# Patient Record
Sex: FEMALE | Race: OTHER | HISPANIC OR LATINO | Employment: UNEMPLOYED | URBAN - METROPOLITAN AREA
[De-identification: names, ages, dates, MRNs, and addresses within clinical notes are randomized per-mention and may not be internally consistent; named-entity substitution may affect disease eponyms.]

---

## 2022-02-22 ENCOUNTER — HOSPITAL ENCOUNTER (OUTPATIENT)
Facility: HOSPITAL | Age: 32
Discharge: PSYCHIATRIC OR SUBSTANCE USE FACILITY | End: 2022-02-23
Attending: EMERGENCY MEDICINE | Admitting: INTERNAL MEDICINE

## 2022-02-22 DIAGNOSIS — R07.9 CHEST PAIN: ICD-10-CM

## 2022-02-22 DIAGNOSIS — T14.91XA SUICIDE ATTEMPT: ICD-10-CM

## 2022-02-22 PROBLEM — T50.912A SUICIDE ATTEMPT BY MULTIPLE DRUG OVERDOSE: Status: ACTIVE | Noted: 2022-02-22

## 2022-02-22 LAB
ALBUMIN SERPL BCP-MCNC: 3.8 G/DL (ref 3.5–5.2)
ALP SERPL-CCNC: 54 U/L (ref 55–135)
ALT SERPL W/O P-5'-P-CCNC: 12 U/L (ref 10–44)
AMORPH CRY UR QL COMP ASSIST: ABNORMAL
AMPHET+METHAMPHET UR QL: NEGATIVE
ANION GAP SERPL CALC-SCNC: 8 MMOL/L (ref 8–16)
APAP SERPL-MCNC: <3 UG/ML (ref 10–20)
AST SERPL-CCNC: 18 U/L (ref 10–40)
B-HCG UR QL: NEGATIVE
BARBITURATES UR QL SCN>200 NG/ML: NEGATIVE
BASOPHILS # BLD AUTO: 0.03 K/UL (ref 0–0.2)
BASOPHILS NFR BLD: 0.5 % (ref 0–1.9)
BENZODIAZ UR QL SCN>200 NG/ML: NEGATIVE
BILIRUB SERPL-MCNC: 0.6 MG/DL (ref 0.1–1)
BILIRUB UR QL STRIP: NEGATIVE
BUN SERPL-MCNC: 8 MG/DL (ref 6–20)
BZE UR QL SCN: NEGATIVE
CALCIUM SERPL-MCNC: 9 MG/DL (ref 8.7–10.5)
CANNABINOIDS UR QL SCN: NEGATIVE
CHLORIDE SERPL-SCNC: 109 MMOL/L (ref 95–110)
CLARITY UR REFRACT.AUTO: ABNORMAL
CO2 SERPL-SCNC: 23 MMOL/L (ref 23–29)
COLOR UR AUTO: YELLOW
CREAT SERPL-MCNC: 0.7 MG/DL (ref 0.5–1.4)
CREAT UR-MCNC: 31 MG/DL (ref 15–325)
CTP QC/QA: YES
CTP QC/QA: YES
DIFFERENTIAL METHOD: NORMAL
EOSINOPHIL # BLD AUTO: 0 K/UL (ref 0–0.5)
EOSINOPHIL NFR BLD: 0.5 % (ref 0–8)
ERYTHROCYTE [DISTWIDTH] IN BLOOD BY AUTOMATED COUNT: 12.5 % (ref 11.5–14.5)
EST. GFR  (AFRICAN AMERICAN): >60 ML/MIN/1.73 M^2
EST. GFR  (NON AFRICAN AMERICAN): >60 ML/MIN/1.73 M^2
ETHANOL SERPL-MCNC: <10 MG/DL
GLUCOSE SERPL-MCNC: 79 MG/DL (ref 70–110)
GLUCOSE UR QL STRIP: NEGATIVE
HCT VFR BLD AUTO: 40.2 % (ref 37–48.5)
HGB BLD-MCNC: 13.4 G/DL (ref 12–16)
HGB UR QL STRIP: ABNORMAL
IMM GRANULOCYTES # BLD AUTO: 0.02 K/UL (ref 0–0.04)
IMM GRANULOCYTES NFR BLD AUTO: 0.3 % (ref 0–0.5)
KETONES UR QL STRIP: NEGATIVE
LEUKOCYTE ESTERASE UR QL STRIP: ABNORMAL
LYMPHOCYTES # BLD AUTO: 1.5 K/UL (ref 1–4.8)
LYMPHOCYTES NFR BLD: 24 % (ref 18–48)
MCH RBC QN AUTO: 28 PG (ref 27–31)
MCHC RBC AUTO-ENTMCNC: 33.3 G/DL (ref 32–36)
MCV RBC AUTO: 84 FL (ref 82–98)
METHADONE UR QL SCN>300 NG/ML: NEGATIVE
MICROSCOPIC COMMENT: ABNORMAL
MONOCYTES # BLD AUTO: 0.3 K/UL (ref 0.3–1)
MONOCYTES NFR BLD: 5.2 % (ref 4–15)
NEUTROPHILS # BLD AUTO: 4.3 K/UL (ref 1.8–7.7)
NEUTROPHILS NFR BLD: 69.5 % (ref 38–73)
NITRITE UR QL STRIP: NEGATIVE
NRBC BLD-RTO: 0 /100 WBC
OPIATES UR QL SCN: NEGATIVE
PCP UR QL SCN>25 NG/ML: NEGATIVE
PH UR STRIP: 7 [PH] (ref 5–8)
PLATELET # BLD AUTO: 201 K/UL (ref 150–450)
PMV BLD AUTO: 9.7 FL (ref 9.2–12.9)
POTASSIUM SERPL-SCNC: 3.6 MMOL/L (ref 3.5–5.1)
PROT SERPL-MCNC: 6.9 G/DL (ref 6–8.4)
PROT UR QL STRIP: NEGATIVE
RBC # BLD AUTO: 4.78 M/UL (ref 4–5.4)
RBC #/AREA URNS AUTO: 5 /HPF (ref 0–4)
SARS-COV-2 RDRP RESP QL NAA+PROBE: NEGATIVE
SODIUM SERPL-SCNC: 140 MMOL/L (ref 136–145)
SP GR UR STRIP: 1 (ref 1–1.03)
SQUAMOUS #/AREA URNS AUTO: 6 /HPF
TOXICOLOGY INFORMATION: NORMAL
TSH SERPL DL<=0.005 MIU/L-ACNC: 0.64 UIU/ML (ref 0.4–4)
URN SPEC COLLECT METH UR: ABNORMAL
WBC # BLD AUTO: 6.16 K/UL (ref 3.9–12.7)
WBC #/AREA URNS AUTO: 5 /HPF (ref 0–5)

## 2022-02-22 PROCEDURE — 80143 DRUG ASSAY ACETAMINOPHEN: CPT | Performed by: EMERGENCY MEDICINE

## 2022-02-22 PROCEDURE — 99291 CRITICAL CARE FIRST HOUR: CPT | Mod: CS,,, | Performed by: EMERGENCY MEDICINE

## 2022-02-22 PROCEDURE — 80307 DRUG TEST PRSMV CHEM ANLYZR: CPT | Performed by: EMERGENCY MEDICINE

## 2022-02-22 PROCEDURE — U0002 COVID-19 LAB TEST NON-CDC: HCPCS | Performed by: EMERGENCY MEDICINE

## 2022-02-22 PROCEDURE — 99220 PR INITIAL OBSERVATION CARE,LEVL III: ICD-10-PCS | Mod: ,,, | Performed by: INTERNAL MEDICINE

## 2022-02-22 PROCEDURE — 96361 HYDRATE IV INFUSION ADD-ON: CPT

## 2022-02-22 PROCEDURE — 63600175 PHARM REV CODE 636 W HCPCS

## 2022-02-22 PROCEDURE — 85025 COMPLETE CBC W/AUTO DIFF WBC: CPT | Performed by: EMERGENCY MEDICINE

## 2022-02-22 PROCEDURE — 80053 COMPREHEN METABOLIC PANEL: CPT | Performed by: EMERGENCY MEDICINE

## 2022-02-22 PROCEDURE — 99291 PR CRITICAL CARE, E/M 30-74 MINUTES: ICD-10-PCS | Mod: CS,,, | Performed by: EMERGENCY MEDICINE

## 2022-02-22 PROCEDURE — 84443 ASSAY THYROID STIM HORMONE: CPT | Performed by: EMERGENCY MEDICINE

## 2022-02-22 PROCEDURE — 96360 HYDRATION IV INFUSION INIT: CPT

## 2022-02-22 PROCEDURE — 82077 ASSAY SPEC XCP UR&BREATH IA: CPT | Performed by: EMERGENCY MEDICINE

## 2022-02-22 PROCEDURE — 81001 URINALYSIS AUTO W/SCOPE: CPT | Performed by: EMERGENCY MEDICINE

## 2022-02-22 PROCEDURE — 99291 CRITICAL CARE FIRST HOUR: CPT | Mod: 25

## 2022-02-22 PROCEDURE — G0378 HOSPITAL OBSERVATION PER HR: HCPCS

## 2022-02-22 PROCEDURE — 81025 URINE PREGNANCY TEST: CPT | Performed by: EMERGENCY MEDICINE

## 2022-02-22 PROCEDURE — 99220 PR INITIAL OBSERVATION CARE,LEVL III: CPT | Mod: ,,, | Performed by: INTERNAL MEDICINE

## 2022-02-22 RX ORDER — NALOXONE HCL 0.4 MG/ML
0.4 VIAL (ML) INJECTION ONCE
Status: DISCONTINUED | OUTPATIENT
Start: 2022-02-22 | End: 2022-02-22

## 2022-02-22 RX ORDER — IBUPROFEN 200 MG
16 TABLET ORAL
Status: DISCONTINUED | OUTPATIENT
Start: 2022-02-22 | End: 2022-02-23 | Stop reason: HOSPADM

## 2022-02-22 RX ORDER — SODIUM CHLORIDE 0.9 % (FLUSH) 0.9 %
10 SYRINGE (ML) INJECTION EVERY 12 HOURS PRN
Status: DISCONTINUED | OUTPATIENT
Start: 2022-02-22 | End: 2022-02-23 | Stop reason: HOSPADM

## 2022-02-22 RX ORDER — IBUPROFEN 200 MG
24 TABLET ORAL
Status: DISCONTINUED | OUTPATIENT
Start: 2022-02-22 | End: 2022-02-23 | Stop reason: HOSPADM

## 2022-02-22 RX ORDER — GLUCAGON 1 MG
1 KIT INJECTION
Status: DISCONTINUED | OUTPATIENT
Start: 2022-02-22 | End: 2022-02-23 | Stop reason: HOSPADM

## 2022-02-22 RX ORDER — NALOXONE HCL 0.4 MG/ML
0.02 VIAL (ML) INJECTION
Status: DISCONTINUED | OUTPATIENT
Start: 2022-02-22 | End: 2022-02-23 | Stop reason: HOSPADM

## 2022-02-22 RX ADMIN — SODIUM CHLORIDE, SODIUM LACTATE, POTASSIUM CHLORIDE, AND CALCIUM CHLORIDE 1000 ML: .6; .31; .03; .02 INJECTION, SOLUTION INTRAVENOUS at 02:02

## 2022-02-22 NOTE — ASSESSMENT & PLAN NOTE
Pt presenting following a suicide attempt, found next to empty bottles of percocet and flexeril. Altered mental status appears likely 2/2 flexeril intoxication. UDS negative for opiates. Bilateral pupils ERRL. No focal neuro deficits. No previous psych hx. Suicide attempt could be 2/2 recent maritial stresses vs adverse medication effect.     Plan:  -- patient PEC'd  -- consult psychiatry once patient more alert and interactive  -- no need for narcan at this time as patient protecting airway, MIKI, no evidence of opioids on UDS  -- ctm O2 while patient sedated  -- IVF bolus

## 2022-02-22 NOTE — H&P
"[facility]  Hospital Medicine  History & Physical    Patient Name: Radha Arroyo  MRN: [id number]  Patient Class: OP- Observation  Admission Date: 2/22/2022  Attending Physician: Monse Chow MD   Primary Care Provider: No primary care provider on file.         Patient information was obtained from spouse/SO and ER records.     Subjective:     Principal Problem:<principal problem not specified>    Chief Complaint:   Chief Complaint   Patient presents with    Suicide Attempt     Found with empty bottle of oxycodone and flexirl. Decrease LOC. Patient arrives AAOx4.         HPI: 30 y/o F no significant pmhx presents via EMS following a suicide attempt. Pt altered at time of admission. History obtained via  and ED provider. Per , he and patient have been having marital problems for a few months.  states patient cheated on him 2 months ago, and ever since then their marriage has struggled. Last night,  mentioned to patient that he "needed some space", and the patient became very upset and anxious. This morning after  took the kids to school, he received a text from the patient saying "take care of the kids for me." He tried to contact the patient but she didn't reply. Pt came home to find the patient drowsy and altered on the ground next to a bottle of percocet and a bottle of flexeril. Unknown how many pills patient took. Pt has no previous psych history or medical history. No past suicide ideations or attempts. Of note, patient is currently on accutane, and was told by her PCP to double her dose 2 weeks ago. Pt is on no other medications.     In ED patient HDS, afebrile. Patient protecting airway, satting well on RA. Drowsy but arousable to deep stimulation, though unable to answer questions or interact. UDS pan negative. EtOH negative. Tylenol negative. Cr wnl, CBC normal. Patient PEC'd and admitted to medicine for medical clearance and psych eval.       No past " medical history on file.    No past surgical history on file.    Review of patient's allergies indicates:  Not on File    No current facility-administered medications on file prior to encounter.     No current outpatient medications on file prior to encounter.     Family History    None       Tobacco Use    Smoking status: Not on file    Smokeless tobacco: Not on file   Substance and Sexual Activity    Alcohol use: Not on file    Drug use: Not on file    Sexual activity: Not on file     Review of Systems   Unable to perform ROS: Mental status change   Objective:     Vital Signs (Most Recent):  Temp: 97.7 °F (36.5 °C) (02/22/22 1407)  Pulse: 83 (02/22/22 1407)  Resp: 16 (02/22/22 1407)  BP: 113/63 (02/22/22 1407)  SpO2: 98 % (02/22/22 1407) Vital Signs (24h Range):  Temp:  [97.4 °F (36.3 °C)-97.7 °F (36.5 °C)] 97.7 °F (36.5 °C)  Pulse:  [70-83] 83  Resp:  [16-18] 16  SpO2:  [98 %-99 %] 98 %  BP: (113-138)/(63-82) 113/63        There is no height or weight on file to calculate BMI.    Physical Exam  Vitals and nursing note reviewed.   Constitutional:       General: She is sleeping. She is not in acute distress.     Appearance: Normal appearance. She is well-developed. She is not ill-appearing, toxic-appearing or diaphoretic.   HENT:      Head: Normocephalic and atraumatic.      Nose: Nose normal. No congestion.      Mouth/Throat:      Mouth: Mucous membranes are dry.   Eyes:      Conjunctiva/sclera: Conjunctivae normal.      Pupils: Pupils are equal, round, and reactive to light.   Cardiovascular:      Rate and Rhythm: Normal rate and regular rhythm.      Pulses: Normal pulses.      Heart sounds: Normal heart sounds. No murmur heard.    No friction rub. No gallop.   Pulmonary:      Effort: Pulmonary effort is normal. No respiratory distress.      Breath sounds: Normal breath sounds. No stridor. No wheezing, rhonchi or rales.      Comments: RR 16, good air movement, protecting airway  Abdominal:      General:  Abdomen is flat. Bowel sounds are normal. There is no distension.      Palpations: Abdomen is soft.      Tenderness: There is no abdominal tenderness.   Musculoskeletal:         General: No swelling. Normal range of motion.      Cervical back: Normal range of motion and neck supple.   Skin:     General: Skin is warm and dry.      Coloration: Skin is not jaundiced.   Neurological:      General: No focal deficit present.      Mental Status: She is easily aroused. She is lethargic.      Comments: Pt drowsy but arousable to voice, whispers appropriate answers to questions, though quickly falls back asleep.      Psychiatric:      Comments: Pt drowsy, lethargic         CRANIAL NERVES     CN III, IV, VI   Pupils are equal, round, and reactive to light.     Significant Labs: All pertinent labs within the past 24 hours have been reviewed.  CBC:   Recent Labs   Lab 02/22/22  1221   WBC 6.16   HGB 13.4   HCT 40.2        CMP:   Recent Labs   Lab 02/22/22  1221      K 3.6      CO2 23   GLU 79   BUN 8   CREATININE 0.7   CALCIUM 9.0   PROT 6.9   ALBUMIN 3.8   BILITOT 0.6   ALKPHOS 54*   AST 18   ALT 12   ANIONGAP 8   EGFRNONAA >60.0     TSH:   Recent Labs   Lab 02/22/22  1221   TSH 0.638     Urine Studies:   Recent Labs   Lab 02/22/22  1206   COLORU Yellow   APPEARANCEUA Hazy*   PHUR 7.0   SPECGRAV 1.005   PROTEINUA Negative   GLUCUA Negative   KETONESU Negative   BILIRUBINUA Negative   OCCULTUA 1+*   NITRITE Negative   LEUKOCYTESUR 3+*   RBCUA 5*   WBCUA 5   SQUAMEPITHEL 6       Significant Imaging: I have reviewed all pertinent imaging results/findings within the past 24 hours.    Assessment/Plan:     Suicide attempt by multiple drug overdose  Pt presenting following a suicide attempt, found next to empty bottles of percocet and flexeril. Altered mental status appears likely 2/2 flexeril intoxication. UDS negative for opiates. Bilateral pupils ERRL. No focal neuro deficits. No previous psych hx. Suicide  attempt could be 2/2 recent maritial stresses vs adverse medication effect.     Plan:  -- patient PEC'd  -- consult psychiatry once patient more alert and interactive  -- no need for narcan at this time as patient protecting airway, MIKI, no evidence of opioids on UDS  -- ctm O2 while patient sedated  -- IVF bolus        VTE Risk Mitigation (From admission, onward)         Ordered     IP VTE LOW RISK PATIENT  Once         02/22/22 1350     Place sequential compression device  Until discontinued         02/22/22 1350                   Richie Short MD   Internal Medicine PGY-1  Department of Hospital Medicine   [facility]

## 2022-02-22 NOTE — SUBJECTIVE & OBJECTIVE
No past medical history on file.    No past surgical history on file.    Review of patient's allergies indicates:  Not on File    No current facility-administered medications on file prior to encounter.     No current outpatient medications on file prior to encounter.     Family History    None       Tobacco Use    Smoking status: Not on file    Smokeless tobacco: Not on file   Substance and Sexual Activity    Alcohol use: Not on file    Drug use: Not on file    Sexual activity: Not on file     Review of Systems   Unable to perform ROS: Mental status change   Objective:     Vital Signs (Most Recent):  Temp: 97.7 °F (36.5 °C) (02/22/22 1407)  Pulse: 83 (02/22/22 1407)  Resp: 16 (02/22/22 1407)  BP: 113/63 (02/22/22 1407)  SpO2: 98 % (02/22/22 1407) Vital Signs (24h Range):  Temp:  [97.4 °F (36.3 °C)-97.7 °F (36.5 °C)] 97.7 °F (36.5 °C)  Pulse:  [70-83] 83  Resp:  [16-18] 16  SpO2:  [98 %-99 %] 98 %  BP: (113-138)/(63-82) 113/63        There is no height or weight on file to calculate BMI.    Physical Exam  Vitals and nursing note reviewed.   Constitutional:       General: She is sleeping. She is not in acute distress.     Appearance: Normal appearance. She is well-developed. She is not ill-appearing, toxic-appearing or diaphoretic.   HENT:      Head: Normocephalic and atraumatic.      Nose: Nose normal. No congestion.      Mouth/Throat:      Mouth: Mucous membranes are dry.   Eyes:      Conjunctiva/sclera: Conjunctivae normal.      Pupils: Pupils are equal, round, and reactive to light.   Cardiovascular:      Rate and Rhythm: Normal rate and regular rhythm.      Pulses: Normal pulses.      Heart sounds: Normal heart sounds. No murmur heard.    No friction rub. No gallop.   Pulmonary:      Effort: Pulmonary effort is normal. No respiratory distress.      Breath sounds: Normal breath sounds. No stridor. No wheezing, rhonchi or rales.      Comments: RR 16, good air movement, protecting airway  Abdominal:      General:  Abdomen is flat. Bowel sounds are normal. There is no distension.      Palpations: Abdomen is soft.      Tenderness: There is no abdominal tenderness.   Musculoskeletal:         General: No swelling. Normal range of motion.      Cervical back: Normal range of motion and neck supple.   Skin:     General: Skin is warm and dry.      Coloration: Skin is not jaundiced.   Neurological:      General: No focal deficit present.      Mental Status: She is easily aroused. She is lethargic.      Comments: Pt drowsy but arousable to voice, whispers appropriate answers to questions, though quickly falls back asleep.      Psychiatric:      Comments: Pt drowsy, lethargic         CRANIAL NERVES     CN III, IV, VI   Pupils are equal, round, and reactive to light.     Significant Labs: All pertinent labs within the past 24 hours have been reviewed.  CBC:   Recent Labs   Lab 02/22/22  1221   WBC 6.16   HGB 13.4   HCT 40.2        CMP:   Recent Labs   Lab 02/22/22  1221      K 3.6      CO2 23   GLU 79   BUN 8   CREATININE 0.7   CALCIUM 9.0   PROT 6.9   ALBUMIN 3.8   BILITOT 0.6   ALKPHOS 54*   AST 18   ALT 12   ANIONGAP 8   EGFRNONAA >60.0     TSH:   Recent Labs   Lab 02/22/22  1221   TSH 0.638     Urine Studies:   Recent Labs   Lab 02/22/22  1206   COLORU Yellow   APPEARANCEUA Hazy*   PHUR 7.0   SPECGRAV 1.005   PROTEINUA Negative   GLUCUA Negative   KETONESU Negative   BILIRUBINUA Negative   OCCULTUA 1+*   NITRITE Negative   LEUKOCYTESUR 3+*   RBCUA 5*   WBCUA 5   SQUAMEPITHEL 6       Significant Imaging: I have reviewed all pertinent imaging results/findings within the past 24 hours.

## 2022-02-22 NOTE — HPI
"30 y/o F no significant pmhx presents via EMS following a suicide attempt. Pt altered at time of admission. History obtained via  and ED provider. Per , he and patient have been having marital problems for a few months.  states patient cheated on him 2 months ago, and ever since then their marriage has struggled. Last night,  mentioned to patient that he "needed some space", and the patient became very upset and anxious. This morning after  took the kids to school, he received a text from the patient saying "take care of the kids for me." He tried to contact the patient but she didn't reply. Pt came home to find the patient drowsy and altered on the ground next to a bottle of percocet and a bottle of flexeril. Unknown how many pills patient took. Pt has no previous psych history or medical history. No past suicide ideations or attempts. Of note, patient is currently on accutane, and was told by her PCP to double her dose 2 weeks ago. Pt is on no other medications.     In ED patient HDS, afebrile. Patient protecting airway, satting well on RA. Drowsy but arousable to deep stimulation, though unable to answer questions or interact. UDS pan negative. EtOH negative. Tylenol negative. Cr wnl, CBC normal. Patient PEC'd and admitted to medicine for medical clearance and psych eval.   "

## 2022-02-22 NOTE — ED NOTES
"Pt BIB EMS after being found by partner at home, pt took all remaining percocet and flexeril, partner unsure of how many pills were left in each bottle.  Upon arrival, pt lethargic, arouses to voice, not following commands, pupils pinpoint and reactive, VSS.  Partner states he recently told patient he "wanted space", and pt texted partner Aditya this am, asking him to please take care of the kids, and was "acting weird".  Aditya asked a friend to check on patient, and pt was found at home after consuming pills.  Pt unable to respond verbally to assess for current SI/HI/AH/VH.  Bed low/locked, siderails upx2, safety sitter at bedside.  "

## 2022-02-22 NOTE — ED NOTES
Pt resting comfortably in bed, opens eyes momentarily to voice, is mumbling when asked questions, following commands.  Pt still not verbalizing/mentating at baseline, unable to assess SI/HI/AH/VH, safety sitter at bedside.  VSS.

## 2022-02-22 NOTE — ED PROVIDER NOTES
Source of History:  pts     Chief complaint:  Suicide Attempt (Found with empty bottle of oxycodone and flexirl. Decrease LOC. Patient arrives AAOx4. )      HPI:  Radha Arroyo is a 31 y.o. female presenting possible suicide attempt.  Patient was found unresponsive by her , EMS noted that she had a bottle of Flexeril and Percocet nearby that were empty.   is unsure how many tablets were in the bottles, but estimates approximately 10. He states that they have been having some marital issues, and discussed taking some space yesterday, but stated that it was not a fight, more of a discussion.  He went to work at 4:00 a.m. this morning, and got a text at about 750 time him to take care of the children.  When he tried to call back she would not answer, prompting him to return to the home where he found her altered.  EMS noted stable vital signs but severe confusion.    ROS: As per HPI and below:    UTO due to AMS    Review of patient's allergies indicates:  Not on File    No current facility-administered medications on file prior to encounter.     No current outpatient medications on file prior to encounter.       PMH:  As per HPI and below:  No past medical history on file.  No past surgical history on file.    Social History     Socioeconomic History    Marital status: Single       No family history on file.    Physical Exam:    Vitals:    02/22/22 1407   BP: 113/63   Pulse: 83   Resp: 16   Temp: 97.7 °F (36.5 °C)     Appearance: No acute distress.  Sedated, lethargic  Skin: No rashes seen.  Good turgor.  No abrasions.  No ecchymoses.  Eyes: No conjunctival injection. EOMI, PERRL.  ENT: Oropharynx clear.    Chest:  No increased work of breathing, bilateral chest rise.  Cardiovascular: Regular rate and rhythm.  Normal equal bilateral radial pulses.  Abdomen: Soft.  Not distended.  Nontender.  No guarding.  No rebound. No Masses  Musculoskeletal: Good range of motion all joints.  No deformities.   Neck supple, full range of motion, no obvious deformity.  Neurologic: Moves all extremities. No facial droop.  Normal speech.    Mental Status:  Opens eyes to voice, follows simple commands, will not speak.          Laboratory Studies:  Labs Reviewed   COMPREHENSIVE METABOLIC PANEL - Abnormal; Notable for the following components:       Result Value    Alkaline Phosphatase 54 (*)     All other components within normal limits   URINALYSIS, REFLEX TO URINE CULTURE - Abnormal; Notable for the following components:    Appearance, UA Hazy (*)     Occult Blood UA 1+ (*)     Leukocytes, UA 3+ (*)     All other components within normal limits    Narrative:     Specimen Source->Urine   ACETAMINOPHEN LEVEL - Abnormal; Notable for the following components:    Acetaminophen (Tylenol), Serum <3.0 (*)     All other components within normal limits   URINALYSIS MICROSCOPIC - Abnormal; Notable for the following components:    RBC, UA 5 (*)     All other components within normal limits    Narrative:     Specimen Source->Urine   CBC W/ AUTO DIFFERENTIAL   TSH   DRUG SCREEN PANEL, URINE EMERGENCY    Narrative:     Specimen Source->Urine   ALCOHOL,MEDICAL (ETHANOL)   SARS-COV-2 RDRP GENE    Narrative:     This test utilizes isothermal nucleic acid amplification   technology to detect the SARS-CoV-2 RdRp nucleic acid segment.   The analytical sensitivity (limit of detection) is 125 genome   equivalents/mL.   A POSITIVE result implies infection with the SARS-CoV-2 virus;   the patient is presumed to be contagious.     A NEGATIVE result means that SARS-CoV-2 nucleic acids are not   present above the limit of detection. A NEGATIVE result should be   treated as presumptive. It does not rule out the possibility of   COVID-19 and should not be the sole basis for treatment decisions.   If COVID-19 is strongly suspected based on clinical and exposure   history, re-testing using an alternate molecular assay should be   considered.   This test is  only for use under the Food and Drug   Administration s Emergency Use Authorization (EUA).   Commercial kits are provided by [employer].   Performance characteristics of the EUA have been independently   verified by [facility] Department of   Pathology and Laboratory Medicine.   _________________________________________________________________   The authorized Fact Sheet for Healthcare Providers and the authorized Fact   Sheet for Patients of the ID NOW COVID-19 are available on the FDA   website:     https://www.fda.gov/media/681435/download  https://www.fda.gov/media/153136/download           POCT URINE PREGNANCY         Imaging Results    None         Medications Given:  Medications   sodium chloride 0.9% flush 10 mL (has no administration in time range)   naloxone 0.4 mg/mL injection 0.02 mg (has no administration in time range)   glucose chewable tablet 16 g (has no administration in time range)   glucose chewable tablet 24 g (has no administration in time range)   glucagon (human recombinant) injection 1 mg (has no administration in time range)   dextrose 10% bolus 125 mL (has no administration in time range)   dextrose 10% bolus 250 mL (has no administration in time range)   lactated ringers bolus 1,000 mL (has no administration in time range)       Discussed with:  Hospital medicine    MDM:    31 y.o. female with altered mental status after possible suicide attempt.  Patient is very drowsy and cannot give further information.  Based on conversation with , PEC was placed, but patient is not medically cleared due to her altered mental status.  Vitals remained stable and patient is protecting her airway.  She was admitted to Hospital Medicine for further evaluation and until sobriety.    Critical Care Time    Critical care time was provided for 35 minutes exclusive of other billable procedures and teaching time for the support of metabolic organ system where the potential for death,  shock, or further decline was possible. Critical care time can include documentation, discussion with consultants, developing a care plan, as well as direct patient care.      Diagnostic Impression:    Suicide attempt         Ronnell Tucker MD  02/22/22 1430       Ronnell Tucker MD  02/22/22 1432

## 2022-02-23 VITALS
DIASTOLIC BLOOD PRESSURE: 71 MMHG | HEART RATE: 71 BPM | SYSTOLIC BLOOD PRESSURE: 124 MMHG | OXYGEN SATURATION: 94 % | TEMPERATURE: 98 F | RESPIRATION RATE: 18 BRPM

## 2022-02-23 LAB
ALBUMIN SERPL BCP-MCNC: 3.6 G/DL (ref 3.5–5.2)
ALP SERPL-CCNC: 52 U/L (ref 55–135)
ALT SERPL W/O P-5'-P-CCNC: 10 U/L (ref 10–44)
ANION GAP SERPL CALC-SCNC: 10 MMOL/L (ref 8–16)
AST SERPL-CCNC: 17 U/L (ref 10–40)
BASOPHILS # BLD AUTO: 0.02 K/UL (ref 0–0.2)
BASOPHILS NFR BLD: 0.4 % (ref 0–1.9)
BILIRUB SERPL-MCNC: 0.8 MG/DL (ref 0.1–1)
BUN SERPL-MCNC: 11 MG/DL (ref 6–20)
CALCIUM SERPL-MCNC: 9 MG/DL (ref 8.7–10.5)
CHLORIDE SERPL-SCNC: 105 MMOL/L (ref 95–110)
CO2 SERPL-SCNC: 24 MMOL/L (ref 23–29)
CREAT SERPL-MCNC: 0.7 MG/DL (ref 0.5–1.4)
DIFFERENTIAL METHOD: NORMAL
EOSINOPHIL # BLD AUTO: 0.1 K/UL (ref 0–0.5)
EOSINOPHIL NFR BLD: 1.7 % (ref 0–8)
ERYTHROCYTE [DISTWIDTH] IN BLOOD BY AUTOMATED COUNT: 13 % (ref 11.5–14.5)
EST. GFR  (AFRICAN AMERICAN): >60 ML/MIN/1.73 M^2
EST. GFR  (NON AFRICAN AMERICAN): >60 ML/MIN/1.73 M^2
GLUCOSE SERPL-MCNC: 66 MG/DL (ref 70–110)
HCT VFR BLD AUTO: 40.6 % (ref 37–48.5)
HGB BLD-MCNC: 13.3 G/DL (ref 12–16)
IMM GRANULOCYTES # BLD AUTO: 0 K/UL (ref 0–0.04)
IMM GRANULOCYTES NFR BLD AUTO: 0 % (ref 0–0.5)
LYMPHOCYTES # BLD AUTO: 1.6 K/UL (ref 1–4.8)
LYMPHOCYTES NFR BLD: 29.7 % (ref 18–48)
MAGNESIUM SERPL-MCNC: 1.9 MG/DL (ref 1.6–2.6)
MCH RBC QN AUTO: 27.8 PG (ref 27–31)
MCHC RBC AUTO-ENTMCNC: 32.8 G/DL (ref 32–36)
MCV RBC AUTO: 85 FL (ref 82–98)
MONOCYTES # BLD AUTO: 0.4 K/UL (ref 0.3–1)
MONOCYTES NFR BLD: 6.9 % (ref 4–15)
NEUTROPHILS # BLD AUTO: 3.3 K/UL (ref 1.8–7.7)
NEUTROPHILS NFR BLD: 61.3 % (ref 38–73)
NRBC BLD-RTO: 0 /100 WBC
PLATELET # BLD AUTO: 178 K/UL (ref 150–450)
PMV BLD AUTO: 9.5 FL (ref 9.2–12.9)
POTASSIUM SERPL-SCNC: 3.7 MMOL/L (ref 3.5–5.1)
PROT SERPL-MCNC: 6.7 G/DL (ref 6–8.4)
RBC # BLD AUTO: 4.79 M/UL (ref 4–5.4)
SODIUM SERPL-SCNC: 139 MMOL/L (ref 136–145)
WBC # BLD AUTO: 5.38 K/UL (ref 3.9–12.7)

## 2022-02-23 PROCEDURE — 85025 COMPLETE CBC W/AUTO DIFF WBC: CPT

## 2022-02-23 PROCEDURE — 83735 ASSAY OF MAGNESIUM: CPT

## 2022-02-23 PROCEDURE — G0378 HOSPITAL OBSERVATION PER HR: HCPCS

## 2022-02-23 PROCEDURE — 80053 COMPREHEN METABOLIC PANEL: CPT

## 2022-02-23 PROCEDURE — 36415 COLL VENOUS BLD VENIPUNCTURE: CPT
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
## 2022-02-23 NOTE — ASSESSMENT & PLAN NOTE
ASSESSMENT     Radha Arroyo is a 31 y.o. female with a no known past psychiatric history or PMHx , who presented to the [facility] due to suicide attempt by medication overdose. Patient endorses some depressive symptoms in addition to psychosocial/familial stressors that lead to overdose. Patient with strained relationship with partner, and did not inform anyone of her overdose attempt. Would recommend continuation of PEC    RECOMMENDATION(S)      1. Scheduled Medication(s):  None, will defer to inpatient psych    2. Legal Status/Precaution(s):  Recommend/continue PEC/CEC as patient is in imminent danger of hurting self or others and is gravely disabled due to a psychiatric illness.

## 2022-02-23 NOTE — HOSPITAL COURSE
Patient admitted for medical clearance following a suicide attempt via flexeril ingestion. Initially on admission, patient drowsy, though arousable and HDS, protecting airway, satting well on RA. Labs stable. Accutane held. Patient not currently on any other medications. Pt given IVF. Mental status consistently improving, patient alert and oriented x4 and at baseline on 2/23. Psychiatry consulted, recommending inpatient psych once medically cleared. Patient stable for discharge to inpatient psych on 2/23.

## 2022-02-23 NOTE — SUBJECTIVE & OBJECTIVE
Patient History               Medical as of 2/23/2022    Past Medical History: Patient provided no pertinent medical history.               Surgical as of 2/23/2022    Past Surgical History: Patient provided no pertinent surgical history.               Family as of 2/23/2022    None               Tobacco Use as of 2/23/2022       Smoking Status Smoking Start Date Smoking Quit Date Packs/Day Years Used    Never Assessed -- -- -- --      Types Comments Smokeless Tobacco Status Smokeless Tobacco Quit Date Source    -- -- Unknown -- --                  Alcohol Use as of 2/23/2022    None               Drug Use as of 2/23/2022    None               Sexual Activity as of 2/23/2022    None               Activities of Daily Living as of 2/23/2022    None               Social Documentation as of 2/23/2022    None               Occupational as of 2/23/2022    None               Socioeconomic as of 2/23/2022       Marital Status Spouse Name Number of Children Years Education Education Level Preferred Language Ethnicity Race Source    Single -- -- -- -- English  or /a Other --                  Pertinent History       Question Response Comments    Lives with -- --    Place in Birth Order -- --    Lives in -- --    Number of Siblings -- --    Raised by -- --    Legal Involvement -- --    Childhood Trauma -- --    Criminal History of -- --    Financial Status -- --    Highest Level of Education -- --    Does patient have access to a firearm? -- --     Service -- --    Primary Leisure Activity -- --    Spirituality -- --          No past medical history on file.  No past surgical history on file.  Family History    None       Tobacco Use    Smoking status: Not on file    Smokeless tobacco: Not on file   Substance and Sexual Activity    Alcohol use: Not on file    Drug use: Not on file    Sexual activity: Not on file     Review of patient's allergies indicates:  Not on File    No current  "facility-administered medications on file prior to encounter.     No current outpatient medications on file prior to encounter.     Psychotherapeutics (From admission, onward)                None          Review of Systems   Constitutional:  Negative for fatigue and fever.   Respiratory:  Negative for cough, chest tightness and shortness of breath.    Cardiovascular:  Negative for chest pain and leg swelling.   Gastrointestinal:  Negative for abdominal pain, diarrhea and nausea.   Musculoskeletal:  Negative for back pain.   Neurological:  Negative for dizziness, seizures, light-headedness and headaches.   Strengths and Liabilities: Strength: Patient accepts guidance/feedback, Strength: Patient is motivated for change., Strength: Patient has positive support network., Liability: Patient is impulsive.    Objective:     Vital Signs (Most Recent):  Temp: 98.4 °F (36.9 °C) (02/23/22 0921)  Pulse: 71 (02/23/22 0921)  Resp: 18 (02/23/22 0921)  BP: 124/71 (02/23/22 0921)  SpO2: (!) 94 % (02/23/22 0921)   Vital Signs (24h Range):  Temp:  [97.2 °F (36.2 °C)-98.6 °F (37 °C)] 98.4 °F (36.9 °C)  Pulse:  [60-83] 71  Resp:  [16-18] 18  SpO2:  [94 %-100 %] 94 %  BP: (113-147)/(63-90) 124/71           There is no height or weight on file to calculate BMI.      Intake/Output Summary (Last 24 hours) at 2/23/2022 1334  Last data filed at 2/22/2022 1711  Gross per 24 hour   Intake 1000 ml   Output --   Net 1000 ml       Physical Exam  Psychiatric:      Comments: Mental Status Exam:  Appearance: fair hygiene and grooming, dressedin hospital scrubs, NAD, appears stated age   Behavior/Cooperation: calm, cooperative, psychomotor retardation  Language: fluent english  Speech: slow rate, low volume  Mood: "sad"  Affect: constricted and depressed  Thought Process: linear, logical, goal-directed  Thought Content: SI+ denies /HI/paranoia/delusions; no objective evidence of paranoia or delusions  Perception: denies AVH; no objective evidence of " AVH   Orientation: grossly intact  Memory: intact to conversation  Attention Span/Concentration: intact to conversation  Fund of Knowledge: appropriate for education level  Insight: poor  Judgment: good           Significant Labs: Last 24 Hours:   Recent Lab Results         02/23/22  0237        Albumin 3.6       Alkaline Phosphatase 52       ALT 10       Anion Gap 10       AST 17       Baso # 0.02       Basophil % 0.4       BILIRUBIN TOTAL 0.8  Comment: For infants and newborns, interpretation of results should be based  on gestational age, weight and in agreement with clinical  observations.    Premature Infant recommended reference ranges:  Up to 24 hours.............<8.0 mg/dL  Up to 48 hours............<12.0 mg/dL  3-5 days..................<15.0 mg/dL  6-29 days.................<15.0 mg/dL         BUN 11       Calcium 9.0       Chloride 105       CO2 24       Creatinine 0.7       Differential Method Automated       eGFR if  >60.0       eGFR if non  >60.0  Comment: Calculation used to obtain the estimated glomerular filtration  rate (eGFR) is the CKD-EPI equation.          Eos # 0.1       Eosinophil % 1.7       Glucose 66       Gran # (ANC) 3.3       Gran % 61.3       Hematocrit 40.6       Hemoglobin 13.3       Immature Grans (Abs) 0.00  Comment: Mild elevation in immature granulocytes is non specific and   can be seen in a variety of conditions including stress response,   acute inflammation, trauma and pregnancy. Correlation with other   laboratory and clinical findings is essential.         Immature Granulocytes 0.0       Lymph # 1.6       Lymph % 29.7       Magnesium 1.9       MCH 27.8       MCHC 32.8       MCV 85       Mono # 0.4       Mono % 6.9       MPV 9.5       nRBC 0       Platelets 178       Potassium 3.7       PROTEIN TOTAL 6.7       RBC 4.79       RDW 13.0       Sodium 139       WBC 5.38               Significant Imaging: I have reviewed all pertinent imaging  results/findings within the past 24 hours.

## 2022-02-23 NOTE — NURSING
Pt discharged per MD orders; Report called to Jo PORTER  at [facility] [place]; Pt transported via wheelchair. PIV d/c'd, no redness or swelling noted at site
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
## 2022-02-23 NOTE — DISCHARGE SUMMARY
"[facility]  Discharge Summary      Patient Name: Radha Arroyo  MRN: [id number]  Patient Class: OP- Observation  Admission Date: 2/22/2022  Hospital Length of Stay: 0 days  Discharge Date and Time:  02/23/2022 8:14 AM  Attending Physician: Monse Chow MD   Discharging Provider: Richie Short MD  Primary Care Provider: No primary care provider on file.  Hospital Medicine Team: [facility] MED 4 Richie Short MD    HPI:   32 y/o F no significant pmhx presents via EMS following a suicide attempt. Pt altered at time of admission. History obtained via  and ED provider. Per , he and patient have been having marital problems for a few months.  states patient cheated on him 2 months ago, and ever since then their marriage has struggled. Last night,  mentioned to patient that he "needed some space", and the patient became very upset and anxious. This morning after  took the kids to school, he received a text from the patient saying "take care of the kids for me." He tried to contact the patient but she didn't reply. Pt came home to find the patient drowsy and altered on the ground next to a bottle of percocet and a bottle of flexeril. Unknown how many pills patient took. Pt has no previous psych history or medical history. No past suicide ideations or attempts. Of note, patient is currently on accutane, and was told by her PCP to double her dose 2 weeks ago. Pt is on no other medications.     In ED patient HDS, afebrile. Patient protecting airway, satting well on RA. Drowsy but arousable to deep stimulation, though unable to answer questions or interact. UDS pan negative. EtOH negative. Tylenol negative. Cr wnl, CBC normal. Patient PEC'd and admitted to medicine for medical clearance and psych eval.       * No surgery found *      Hospital Course:   Patient admitted for medical clearance following a suicide attempt via flexeril ingestion. Initially on " admission, patient drowsy, though arousable and HDS, protecting airway, satting well on RA. Labs stable. Accutane held. Patient not currently on any other medications. Pt given IVF. Mental status consistently improving, patient alert and oriented x4 and at baseline on 2/23. Psychiatry consulted, recommending inpatient psych once medically cleared. Patient stable for discharge to inpatient psych on 2/23.      Wt Readings from Last 3 Encounters:   No data found for Wt     Temp Readings from Last 3 Encounters:   02/23/22 98.6 °F (37 °C) (Oral)     BP Readings from Last 3 Encounters:   02/23/22 (!) 134/90     Pulse Readings from Last 3 Encounters:   02/23/22 64       Physical Exam  Vitals and nursing note reviewed.   Constitutional:       General: She is not in acute distress.     Appearance: Normal appearance. She is not ill-appearing, toxic-appearing or diaphoretic.   HENT:      Head: Normocephalic and atraumatic.      Nose: Nose normal. No congestion or rhinorrhea.      Mouth/Throat:      Mouth: Mucous membranes are dry.   Eyes:      Extraocular Movements: Extraocular movements intact.      Conjunctiva/sclera: Conjunctivae normal.      Pupils: Pupils are equal, round, and reactive to light.   Cardiovascular:      Rate and Rhythm: Normal rate and regular rhythm.      Pulses: Normal pulses.      Heart sounds: Normal heart sounds. No murmur heard.    No friction rub. No gallop.   Pulmonary:      Effort: Pulmonary effort is normal. No respiratory distress.      Breath sounds: Normal breath sounds. No wheezing, rhonchi or rales.   Abdominal:      General: Abdomen is flat. Bowel sounds are normal.      Palpations: Abdomen is soft.      Tenderness: There is no abdominal tenderness.   Musculoskeletal:         General: No swelling. Normal range of motion.      Cervical back: Normal range of motion and neck supple.   Skin:     General: Skin is warm and dry.   Neurological:      General: No focal deficit present.      Mental  Status: She is alert and oriented to person, place, and time. Mental status is at baseline.   Psychiatric:      Comments: Patient calm and cooperative, sitting in bed, speaking softly         Goals of Care Treatment Preferences:  Code Status: Full Code      Consults:   Consults (From admission, onward)        Status Ordering Provider     Inpatient consult to Psychiatry  Once        Provider:  (Not yet assigned)    Acknowledged RICHIE SHORT          * Suicide attempt by multiple drug overdose  Pt presenting following a suicide attempt, found next to empty bottles of percocet and flexeril. Altered mental status appears likely 2/2 flexeril intoxication. UDS negative for opiates. Bilateral pupils ERRL. No focal neuro deficits. No previous psych hx. Suicide attempt could be 2/2 recent maritial stresses vs adverse medication effect. Mental status improving, now AAOx4 and at baseline on 2/23. Psychiatry consulted, recommend admission to inpatient psych now that patient medically stable.     Plan:  -- patient PEC'd  -- no need for narcan at this time as patient protecting airway, MIKI, no evidence of opioids on UDS  -- PO as tolerated        Final Active Diagnoses:    Diagnosis Date Noted POA    PRINCIPAL PROBLEM:  Suicide attempt by multiple drug overdose [T50.912A] 02/22/2022 Yes      Problems Resolved During this Admission:       Discharged Condition: stable    Disposition: Psychiatric Hospital    Follow Up:    Patient Instructions:   No discharge procedures on file.      Pending Diagnostic Studies:     None         Medications:  Reconciled Home Medications:      Medication List      You have not been prescribed any medications.         Indwelling Lines/Drains at time of discharge:   Lines/Drains/Airways     None                 Time spent on the discharge of patient: 45 minutes         Richie Short MD   Internal Medicine PGY-1  Department of [facility] Medicine  [facility]

## 2022-02-23 NOTE — ASSESSMENT & PLAN NOTE
Pt presenting following a suicide attempt, found next to empty bottles of percocet and flexeril. Altered mental status appears likely 2/2 flexeril intoxication. UDS negative for opiates. Bilateral pupils ERRL. No focal neuro deficits. No previous psych hx. Suicide attempt could be 2/2 recent maritial stresses vs adverse medication effect. Mental status improving, now AAOx4 and at baseline on 2/23. Psychiatry consulted, recommend admission to inpatient psych now that patient medically stable.     Plan:  -- patient PEC'd  -- no need for narcan at this time as patient protecting airway, MIKI, no evidence of opioids on UDS  -- PO as tolerated

## 2022-02-23 NOTE — PLAN OF CARE
Pt stable to dc to inpatient psych today, Sw placed ADT 32 order and will follow with psych bed and number for report to the nurse once secured.

## 2022-02-23 NOTE — PLAN OF CARE
Pt is alert but drowsy No C/O pain or discomfort noted during shift. Sitter at bedside for safety.Patient remained NPO as ordered by MD.  Bed in low position call light within reach, safety maintained throughout shift.

## 2022-02-23 NOTE — PLAN OF CARE
Pt has a psych bed secured at [facility], transport to be arranged by [facility]. Nurse to call report to [phone] ext [phone], nurse aware.
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
## 2022-02-23 NOTE — PLAN OF CARE
[facility] - Med Surg  Discharge Final Note    Primary Care Provider: No primary care provider on file.    Expected Discharge Date: 2/23/2022    Final Discharge Note (most recent)     Final Note - 02/23/22 1424        Final Note    Assessment Type Final Discharge Note     Anticipated Discharge Disposition Psychiatric Hospital        Post-Acute Status    Post-Acute Authorization Placement     Post-Acute Placement Status Set-up Complete/Auth obtained   [facility]                Important Message from Medicare             Contact Info     [facility]   Specialty: Psychiatry, Behavioral Health    [address]  [facility] [address]   Phone: [phone]       Next Steps: Follow up today          Sissy Palm RN  Ext [phone]

## 2024-01-31 ENCOUNTER — PATIENT MESSAGE (OUTPATIENT)
Dept: OBSTETRICS AND GYNECOLOGY | Facility: CLINIC | Age: 34
End: 2024-01-31
Payer: MEDICAID

## 2024-07-29 ENCOUNTER — OFFICE VISIT (OUTPATIENT)
Dept: OBSTETRICS AND GYNECOLOGY | Facility: CLINIC | Age: 34
End: 2024-07-29
Payer: MEDICAID

## 2024-07-29 VITALS
SYSTOLIC BLOOD PRESSURE: 118 MMHG | WEIGHT: 188.94 LBS | DIASTOLIC BLOOD PRESSURE: 82 MMHG | BODY MASS INDEX: 32.43 KG/M2

## 2024-07-29 DIAGNOSIS — Z12.4 CERVICAL CANCER SCREENING: ICD-10-CM

## 2024-07-29 DIAGNOSIS — Z01.419 ROUTINE GYNECOLOGICAL EXAMINATION: Primary | ICD-10-CM

## 2024-07-29 PROCEDURE — 99999 PR PBB SHADOW E&M-EST. PATIENT-LVL III: CPT | Mod: PBBFAC,,, | Performed by: OBSTETRICS & GYNECOLOGY

## 2024-07-29 PROCEDURE — 3008F BODY MASS INDEX DOCD: CPT | Mod: CPTII,,, | Performed by: OBSTETRICS & GYNECOLOGY

## 2024-07-29 PROCEDURE — 1159F MED LIST DOCD IN RCRD: CPT | Mod: CPTII,,, | Performed by: OBSTETRICS & GYNECOLOGY

## 2024-07-29 PROCEDURE — 3074F SYST BP LT 130 MM HG: CPT | Mod: CPTII,,, | Performed by: OBSTETRICS & GYNECOLOGY

## 2024-07-29 PROCEDURE — 3079F DIAST BP 80-89 MM HG: CPT | Mod: CPTII,,, | Performed by: OBSTETRICS & GYNECOLOGY

## 2024-07-29 PROCEDURE — 87624 HPV HI-RISK TYP POOLED RSLT: CPT | Performed by: OBSTETRICS & GYNECOLOGY

## 2024-07-29 PROCEDURE — 88175 CYTOPATH C/V AUTO FLUID REDO: CPT | Performed by: PATHOLOGY

## 2024-07-29 PROCEDURE — 99213 OFFICE O/P EST LOW 20 MIN: CPT | Mod: PBBFAC,PO | Performed by: OBSTETRICS & GYNECOLOGY

## 2024-07-29 PROCEDURE — 99395 PREV VISIT EST AGE 18-39: CPT | Mod: S$PBB,,, | Performed by: OBSTETRICS & GYNECOLOGY

## 2024-07-29 NOTE — PROGRESS NOTES
32 yo  female who presents for routine gyn visit.  Reg cycles q month. No complaints.  S/p bilateral salpingecftomy in 2018.  .  Declines STD Testing.  Wants pap for today.    ROS:  GENERAL: Denies weight gain or weight loss. Feeling well overall.   SKIN: Denies rash or lesions.   HEAD: Denies head injury or headache.   CHEST: Denies chest pain or shortness of breath.   CARDIOVASCULAR: Denies palpitations or left sided chest pain.   ABDOMEN: No abdominal pain, constipation, diarrhea, nausea, vomiting or rectal bleeding.   URINARY: No frequency, dysuria, hematuria, or burning on urination.  REPRODUCTIVE: copious vaginal discharge  BREASTS: denies pain, lumps, or nipple discharge.   HEMATOLOGIC: No easy bruisability or excessive bleeding.   MUSCULOSKELETAL: Denies joint pain or swelling.   NEUROLOGIC: Denies syncope or weakness.   PSYCHIATRIC: Denies depression, anxiety or mood swings.       PE:   Vitals: /82   Wt 85.7 kg (188 lb 15 oz)   LMP 2024 (Exact Date)   BMI 32.43 kg/m²   APPEARANCE: Well nourished, well developed, in no acute distress.  SKIN: Normal skin turgor, no lesions.  ABDOMEN: Soft. No tenderness or masses. No hepatosplenomegaly. No hernias.  BREASTS: Symmetrical, no skin changes or visible lesions. No palpable masses, nipple discharge or adenopathy bilaterally.  PELVIC: Normal external female genitalia without lesions. Normal hair distribution. Adequate perineal body, normal urethral meatus. Vagina moist and well rugated without lesions, white discharge. Cervix pink and without lesions. No significant cystocele or rectocele. Bimanual exam showed uterus normal size, shape, position, mobile and nontender. Adnexa without masses or tenderness. Urethra and bladder normal.          AP  Routine gyn  -s/p normal breast exam:   -s/p normal pelvic exam:   -Pap and hpv collected:   -STD testing: declined  -contraception: s/p BTL in 2018    F/u in 1 yr     MENDEL Olivas MD

## 2024-07-31 LAB
HPV HR 12 DNA SPEC QL NAA+PROBE: NEGATIVE
HPV16 AG SPEC QL: NEGATIVE
HPV18 DNA SPEC QL NAA+PROBE: NEGATIVE

## 2024-08-01 LAB
FINAL PATHOLOGIC DIAGNOSIS: NORMAL
Lab: NORMAL

## 2025-03-03 ENCOUNTER — TELEPHONE (OUTPATIENT)
Dept: CARDIOLOGY | Facility: CLINIC | Age: 35
End: 2025-03-03
Payer: MEDICAID

## 2025-03-03 NOTE — TELEPHONE ENCOUNTER
Pt appt was rescheduled  pt will see appt in her portal          ----- Message from Rafa sent at 3/3/2025  1:49 PM CST -----  Type:  Sooner Appointment RequestCaller is requesting a sooner appointment.  Caller declined first available appointment listed below.  Caller will not accept being placed on the waitlist and is requesting a message be sent to doctor.Name of Caller:pt When is the first available appointment?04/28 (procedure is on 04/25 lipo)Symptoms:n/a Would the patient rather a call back or a response via [facility]? Call Best Call Back Number:  [phone] Information: caller stated she wants Dr Oliva only recommended by a friend Caller would like a call back